# Patient Record
Sex: FEMALE | URBAN - METROPOLITAN AREA
[De-identification: names, ages, dates, MRNs, and addresses within clinical notes are randomized per-mention and may not be internally consistent; named-entity substitution may affect disease eponyms.]

---

## 2020-09-03 ENCOUNTER — TELEPHONE (OUTPATIENT)
Dept: BARIATRICS/WEIGHT MGMT | Age: 45
End: 2020-09-03

## 2020-09-03 NOTE — TELEPHONE ENCOUNTER
Completed Online Information session: 7/1/20    Verified  Insurance Benefit   with: Alistair Flores at [facility]. Patient does not have benefit for bariatric surgery. Patient informed the following: This is NOT a guarantee of payment  When stating that you have a Benefit or Coverage for Bariatric Surgery - that means that you may qualify for the surgery  Bariatric Surgery is considered an elective procedure, patient is responsible to know their benefits . Any information we obtain when calling your insurance  is not  a guarantee of  coverage  and/or  benefit. Appointment Note :   New Patient , ,      month visits,  PG Fee $200,  Advise to bring completed new    patient  packet. Remind  Patient of  $200  Program fee with  $ 100  Required at  Second visit with office on initial dietician visit. Remind Patient they must be nicotine free. They will be tested at the beginning of the program and prior to surgery. Advise  Patient  Responsible for out of pocket, copay at medical visits,  Deductible and coinsurance applied to medical visits and procedure. You will be responsible for any of the following:  · Copays   · Deductibles   · Co insurances     The items mentioned above are  indicated or required by your insurance plan. Your deductible and coinsurance are applied to medical visits and procedures. Verified with patient if he or she has had any previous bariatric surgery?    ( If yes ,advise patient of transfer of care process and program fee)